# Patient Record
Sex: FEMALE | Race: BLACK OR AFRICAN AMERICAN | Employment: UNEMPLOYED | URBAN - METROPOLITAN AREA
[De-identification: names, ages, dates, MRNs, and addresses within clinical notes are randomized per-mention and may not be internally consistent; named-entity substitution may affect disease eponyms.]

---

## 2019-05-28 ENCOUNTER — HOSPITAL ENCOUNTER (OUTPATIENT)
Dept: PHYSICAL THERAPY | Age: 29
Discharge: HOME | End: 2019-05-28
Payer: MEDICAID

## 2019-05-28 ENCOUNTER — APPOINTMENT (OUTPATIENT)
Dept: PHYSICAL THERAPY | Age: 29
End: 2019-05-28

## 2019-05-28 PROCEDURE — 97162 PT EVAL MOD COMPLEX 30 MIN: CPT
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
## 2019-05-28 NOTE — PROGRESS NOTES
PHYSICAL THERAPY - DAILY TREATMENT NOTE    Patient Name: Tavares Berger        Date: 2019  : 1990   yes Patient  Verified  Visit #:      of   8  Insurance: Payor: [id number] [employer] / Plan: Johnshukri Duffykeagan / Product Type: Managed Care Medicaid /      In time: 66 Out time: 7280   Total Treatment Time: 25     Medicare/BCBS Time Tracking (below)   Total Timed Codes (min):  0 1:1 Treatment Time:  25     TREATMENT AREA =  Pain in right shoulder [M25.511]    SUBJECTIVE  Pain Level (on 0 to 10 scale):  10  / 10   Medication Changes/New allergies or changes in medical history, any new surgeries or procedures?    no  If yes, update Summary List   Subjective Functional Status/Changes:  []  No changes reported     See POC          OBJECTIVE    Billed With/As:   [x] TE   [] TA   [] Neuro   [] Self Care Patient Education: [x] Review HEP    [] Progressed/Changed HEP based on:   [] positioning   [] body mechanics   [] transfers   [] heat/ice application    [] other:      Other Objective/Functional Measures:    See POC     Post Treatment Pain Level (on 0 to 10) scale:   10  / 10     ASSESSMENT  Assessment/Changes in Function:     See POC     []  See Progress Note/Recertification   Patient will continue to benefit from skilled PT services to modify and progress therapeutic interventions, address functional mobility deficits, address ROM deficits, address strength deficits, analyze and address soft tissue restrictions, analyze and cue movement patterns, analyze and modify body mechanics/ergonomics and assess and modify postural abnormalities to attain remaining goals.    Progress toward goals / Updated goals:    See POC     PLAN  [x]  Upgrade activities as tolerated yes Continue plan of care   []  Discharge due to :    []  Other:      Therapist: Casey Zamarripa, PT    Date: 2019 Time: 12:52 PM     Future Appointments   Date Time Provider Handy Amaya   2019  8:30 AM Maxi Alexandrea Ma [facility]   6/3/2019 10:30 AM Senthil Hermann, PT [facility]   6/6/2019 11:30 AM Carolynn Bumps, PTA [facility]   6/10/2019 10:00 AM Carolynn Bumps, PTA [facility]   6/14/2019  9:30 AM Carolynn Bumps, PTA [facility]   6/17/2019 10:30 AM Carolynn Bumps, PTA [facility]   6/21/2019  9:00 AM Romana Irvin [facility]   6/24/2019 10:30 AM Romana [facility]   6/28/2019  9:30 AM Sagrario Caballero, PTA [facility]

## 2019-05-31 ENCOUNTER — HOSPITAL ENCOUNTER (OUTPATIENT)
Dept: PHYSICAL THERAPY | Age: 29
End: 2019-05-31
Payer: MEDICAID

## 2019-06-03 ENCOUNTER — HOSPITAL ENCOUNTER (OUTPATIENT)
Dept: PHYSICAL THERAPY | Age: 29
Discharge: HOME | End: 2019-06-03
Payer: MEDICAID

## 2019-06-03 PROCEDURE — 97140 MANUAL THERAPY 1/> REGIONS: CPT

## 2019-06-03 PROCEDURE — 97110 THERAPEUTIC EXERCISES: CPT

## 2019-06-03 NOTE — PROGRESS NOTES
PHYSICAL THERAPY - DAILY TREATMENT NOTE    Patient Name: Tavares Berger        Date: 6/3/2019  : 1990   yes Patient  Verified  Visit #:   2   of   8  Insurance: Payor: [employer] MEDICAID / Plan: Forrest Tatikeagan / Product Type: Managed Care Medicaid /      In time: 2969 Out time: 1107   Total Treatment Time: 52     Medicare/BCBS Time Tracking (below)   Total Timed Codes (min):  42 1:1 Treatment Time:  38     TREATMENT AREA =  Pain in right shoulder [M25.511]    SUBJECTIVE  Pain Level (on 0 to 10 scale):  8  / 10   Medication Changes/New allergies or changes in medical history, any new surgeries or procedures?    no  If yes, update Summary List   Subjective Functional Status/Changes:  []  No changes reported     Patient reports the pain in her shoulder is still very high and denies changes in symptoms since her LV. OBJECTIVE  Modalities Rationale:     decrease pain and increase tissue extensibility to improve patient's ability to perform self care activities. min [] Estim, type/location:                                      []  att     []  unatt     []  w/US     []  w/ice    []  w/heat    min []  Mechanical Traction: type/lbs                   []  pro   []  sup   []  int   []  cont    []  before manual    []  after manual    min []  Ultrasound, settings/location:      min []  Iontophoresis w/ dexamethasone, location:                                               []  take home patch       []  in clinic   10 min []  Ice     [x]  Heat    location/position: To R shoulder in long sit    min []  Vasopneumatic Device, press/temp:     min []  Other:    [x] Skin assessment post-treatment (if applicable):    [x]  intact    []  redness- no adverse reaction     []redness - adverse reaction:        32 min Therapeutic Exercise:  [x]  See flow sheet   Rationale:      increase ROM and increase strength to improve the patients ability to perform reaching activities.       10 min Manual Therapy: STM to R posterior RC, R deltoid, R shoulder PROM   Rationale:      decrease pain, increase ROM, increase tissue extensibility and decrease trigger points to improve patient's ability to perform carrying activities. Billed With/As:   [x] TE   [] TA   [] Neuro   [] Self Care Patient Education: [x] Review HEP    [] Progressed/Changed HEP based on:   [] positioning   [] body mechanics   [] transfers   [] heat/ice application    [] other:      Other Objective/Functional Measures:    1:1 TE 29'  Progressed therapy program per flowsheet in order to improve R shoulder ROM and reduce pain  Unable to effectively perform PROM treatment as patient continually demonstrated significant muscle guarding despite consistent verbal cuing     Post Treatment Pain Level (on 0 to 10) scale:   9  / 10     ASSESSMENT  Assessment/Changes in Function:     Patient noted increased symptoms post session. Educated patient on likely increase in soreness related to new exercise program and how to respond appropriately. Patient would benefit from continued PT in order to establish improved ROM with less overall pain. []  See Progress Note/Recertification   Patient will continue to benefit from skilled PT services to modify and progress therapeutic interventions, address functional mobility deficits, address ROM deficits, address strength deficits, analyze and address soft tissue restrictions, analyze and cue movement patterns, analyze and modify body mechanics/ergonomics and assess and modify postural abnormalities to attain remaining goals. Progress toward goals / Updated goals:    No progress with short or long term pain goals at this time.       PLAN  [x]  Upgrade activities as tolerated yes Continue plan of care   []  Discharge due to :    []  Other:      Therapist: Jeff House PT    Date: 6/3/2019 Time: 12:43 PM     Future Appointments   Date Time Provider Handy Amaya   6/6/2019 11:30 AM Pan Marshall PTA [facility]   6/10/2019 10:00 AM Karlos Xavier, ABBI [facility]   6/14/2019  9:30 AM Karlos Xavier, ABBI [facility]   6/17/2019 10:30 AM Karlos Xavier PTA [facility]   6/21/2019  9:00 AM Tino Meckel [facility]   6/24/2019 10:30 AM Tino Meckel [facility]   6/28/2019  9:30 AM Erika Cortez, PTA [facility]

## 2019-06-12 NOTE — PROGRESS NOTES
[address] PHYSICAL THERAPY  [address] [place] [address] [address],[place], [address] - Phone: [phone]  Fax: [phone]    DISCHARGE NOTE  Patient Name: Donnie Sepulveda : 1990   Treatment/Medical Diagnosis: Pain in right shoulder [M25.511]   Referral Source: Baljinder Jameson MD     Date of Initial Visit: 2019 Attended Visits: 2 Missed Visits: 3       SUMMARY OF TREATMENT  Pt attended only initial evaluation and     1     follow-ups and then did not return. Therefore a formal reassessment of goals was not performed. RECOMMENDATIONS  Discontinue physical therapy due to patient not returning. If you have any questions/comments please contact us directly at [phone]. Thank you for allowing us to assist in the care of your patient. Therapist Signature: Priyanka Norton PTA Date: 19     Time: 7:28 AM     NOTE TO PHYSICIAN:  Your patient's insurance requires this discharge note be signed and returned. PLEASE COMPLETE THE ORDERS BELOW AND RETURN TO:  [facility] PHYSICAL THERAP[facility]    ___ I have read the above report and request that my patient be discharged from therapy.      Physician Signature:        Date:       Time:

## 2019-06-14 ENCOUNTER — APPOINTMENT (OUTPATIENT)
Dept: PHYSICAL THERAPY | Age: 29
End: 2019-06-14
Payer: MEDICAID

## 2019-06-17 ENCOUNTER — APPOINTMENT (OUTPATIENT)
Dept: PHYSICAL THERAPY | Age: 29
End: 2019-06-17
Payer: MEDICAID

## 2019-06-21 ENCOUNTER — APPOINTMENT (OUTPATIENT)
Dept: PHYSICAL THERAPY | Age: 29
End: 2019-06-21
Payer: MEDICAID

## 2019-06-24 ENCOUNTER — APPOINTMENT (OUTPATIENT)
Dept: PHYSICAL THERAPY | Age: 29
End: 2019-06-24
Payer: MEDICAID

## 2019-06-28 ENCOUNTER — APPOINTMENT (OUTPATIENT)
Dept: PHYSICAL THERAPY | Age: 29
End: 2019-06-28
Payer: MEDICAID